# Patient Record
(demographics unavailable — no encounter records)

---

## 2018-02-07 NOTE — RAD
RIGHT HAND THREE VIEWS:

 

Indication: Laceration injury. 

 

FINDINGS: 

There is a partial amputation of the second digit at the level of the DIP joint with a small bone fra
gment projecting just distal to the middle phalanx. There is overlying bandaging. Bandaging overlies 
the thumb at the level of the proximal and distal phalanx. 

 

Scattered mild osteophytosis is present.

 

There is a subtle contour deformity of the fifth metacarpal which may relate to sequellae from remote
 trauma.  

 

IMPRESSION: 

1. Partial amputation injury at the second digit with overlying bandaging. 

2. Bandaging overlying the thumb with soft tissue prominence. 

3. Mild osteophytosis. 

 

POS: [facility]